# Patient Record
Sex: FEMALE | Race: WHITE | Employment: FULL TIME | URBAN - METROPOLITAN AREA
[De-identification: names, ages, dates, MRNs, and addresses within clinical notes are randomized per-mention and may not be internally consistent; named-entity substitution may affect disease eponyms.]

---

## 2017-11-28 ENCOUNTER — OFFICE VISIT (OUTPATIENT)
Dept: INTERNAL MEDICINE CLINIC | Age: 32
End: 2017-11-28

## 2017-11-28 VITALS
BODY MASS INDEX: 29.45 KG/M2 | HEIGHT: 60 IN | WEIGHT: 150 LBS | RESPIRATION RATE: 16 BRPM | HEART RATE: 112 BPM | DIASTOLIC BLOOD PRESSURE: 78 MMHG | SYSTOLIC BLOOD PRESSURE: 115 MMHG

## 2017-11-28 DIAGNOSIS — M79.A29 NON-TRAUMATIC COMPARTMENT SYNDROME OF LOWER EXTREMITY, UNSPECIFIED LATERALITY: ICD-10-CM

## 2017-11-28 DIAGNOSIS — F17.200 TOBACCO DEPENDENCE: ICD-10-CM

## 2017-11-28 DIAGNOSIS — Z00.00 ANNUAL PHYSICAL EXAM: Primary | ICD-10-CM

## 2017-11-28 PROCEDURE — 99385 PREV VISIT NEW AGE 18-39: CPT | Performed by: INTERNAL MEDICINE

## 2017-11-28 RX ORDER — BUPROPION HYDROCHLORIDE 150 MG/1
150 TABLET, EXTENDED RELEASE ORAL 2 TIMES DAILY
Qty: 60 TABLET | Refills: 2 | Status: SHIPPED | OUTPATIENT
Start: 2017-11-28 | End: 2018-03-10 | Stop reason: SDUPTHER

## 2017-11-28 RX ORDER — IBUPROFEN 600 MG/1
600 TABLET ORAL 2 TIMES DAILY WITH MEALS
COMMUNITY

## 2017-11-28 NOTE — PATIENT INSTRUCTIONS
[facility] in [address] [place] [address] Urgent Care  [address], [address], AUGIE Romero 89  M-F: 8 am - 8 pm  Sat: 9 am - 5 pm  [phone]       Preventive plan of care for Mayra Hollis        11/28/2017           Preventive Measures Status       Recommendations for screening           Cervical Cancer Screen   PAP smear:  Test is due per GYN   Osteoporosis Screen   DEXA scan  This test is not clinically indicated   Diabetes Screen  No results found for: GLUCOSE Test recommended and ordered   Cholesterol Screen  No results found for: CHOL, TRIG, HDL, LDLCALC, LDLDIRECT Test recommended and ordered   Aspirin for Cardiovascular Prevention   No Not indicated   Weight: Body mass index is 29.29 kg/m².   5' (1.524 m)150 lb (68 kg)    Your BMI is 25 or greater, which indicates that you are overweight   Living Will: No   Full Code    Recommended Immunizations    Immunization History   Administered Date(s) Administered    Influenza Virus Vaccine 10/23/2017    Tdap (Boostrix, Adacel) 09/11/2013        Influenza vaccine:  recommended every fall         Other Recommendations ·   See a dentist every 6 months  · Try to get at least 30 minutes of exercise 3-5 days per week  · Always wear a seat belt when traveling in a car  · Always wear a helmet when riding a bicycle or motorcycle  · When exposed to the sun, use a sunscreen that protects against both UVA and UVB radiation with an SPF of 30 or greater- reapply every 2 to 3 hours or after sweating, drying off with a towel, or swimming  · You need 500 mg of calcium and 2916-8830 IU of vitamin D per day- it is possible to meet your calcium requirement with diet alone, but a vitamin D supplement is usually necessary

## 2017-11-28 NOTE — PROGRESS NOTES
[facility]  History and Physical  Veverly GABBI Clifton Valentina Evans  YOB: 1985    Date of Service:  11/28/2017    Chief Complaint:   Valentina Evans is a 33 y.o. female who presents for   Chief Complaint   Patient presents with   Edi Pascual New Doctor       HPI: Here to Establish and for Annual Physical and Follow up. Compartment syndrome of both legs:  Stable on ibuprofen 600 mg bid with food  Tob dependence:  Wants to try wellbutrin to quit smoking. No results found for: LABA1C, LABMICR  No results found for: NA, K, CL, CO2, BUN, CREATININE, GLUCOSE, CALCIUM  No results found for: CHOL, TRIG, HDL, LDLCALC, LDLDIRECT  No results found for: ALT, AST  No results found for: TSH, T4FREE  Lab Results   Component Value Date    WBC 14.1 (H) 09/10/2013    HGB 10.0 (L) 09/10/2013    HCT 30.3 (L) 09/10/2013    MCV 95.0 09/10/2013     09/10/2013     No results found for: INR   No results found for: PSA   No results found for: LABURIC     Wt Readings from Last 3 Encounters:   11/28/17 150 lb (68 kg)   08/29/16 171 lb 15.3 oz (78 kg)   09/09/13 172 lb (78 kg)     BP Readings from Last 3 Encounters:   11/28/17 115/78   09/11/13 131/88       Patient Active Problem List   Diagnosis    Non-traumatic compartment syndrome of lower extremity    Tobacco dependence       No Known Allergies  Outpatient Prescriptions Marked as Taking for the 11/28/17 encounter (Office Visit) with Oumou Aldridge MD   Medication Sig Dispense Refill    ibuprofen (ADVIL;MOTRIN) 600 MG tablet Take 600 mg by mouth 2 times daily (with meals)         History reviewed. No pertinent past medical history.   Past Surgical History:   Procedure Laterality Date    FASCIOTOMY Bilateral 2008    compartment syndrome     Family History   Problem Relation Age of Onset    Asthma Father     High Blood Pressure Father     High Cholesterol Father     Arthritis Paternal Aunt     Cancer Paternal Aunt      lung    Early Death Paternal Aunt 47    Hearing Loss Paternal Aunt     High Blood Pressure Paternal Aunt     High Cholesterol Paternal Aunt     Cancer Paternal Uncle      lung    Early Death Paternal Uncle 61    Hearing Loss Paternal Uncle     High Blood Pressure Paternal Uncle     High Cholesterol Paternal Uncle     Early Death Maternal Grandmother 36    Early Death Paternal Grandmother 48    Heart Attack Paternal Grandfather 48    Early Death Paternal Grandfather 48    Kidney Disease Mother     Stroke Paternal Uncle     High Blood Pressure Paternal Uncle      Social History     Social History    Marital status: Single     Spouse name: N/A    Number of children: N/A    Years of education: N/A     Occupational History    Not on file. Social History Main Topics    Smoking status: Current Every Day Smoker     Packs/day: 0.50     Years: 19.00    Smokeless tobacco: Never Used    Alcohol use Yes      Comment: rare    Drug use: No    Sexual activity: Yes     Partners: Male     Other Topics Concern    Not on file     Social History Narrative    No narrative on file       Review of Systems:  A comprehensive review of systems was negative except for what was noted in the HPI. Physical Exam:   Vitals:    11/28/17 1444   BP: 115/78   Pulse: 112   Resp: 16   Weight: 150 lb (68 kg)   Height: 5' (1.524 m)     Body mass index is 29.29 kg/m². Constitutional: She is oriented to person, place, and time. She appears well-developed and well-nourished. No distress. HEENT:   Head: Normocephalic and atraumatic. Right Ear: Tympanic membrane, external ear and ear canal normal.   Left Ear: Tympanic membrane, external ear and ear canal normal.   Mouth/Throat: Oropharynx is clear and moist, and mucous membranes are normal.  There is no cervical adenopathy. Eyes: Conjunctivae and extraocular motions are normal. Pupils are equal, round, and reactive to light. Neck: Supple. No JVD present.  Carotid bruit is not present. No mass and no thyromegaly present. Cardiovascular: Normal rate, regular rhythm, normal heart sounds and intact distal pulses. Exam reveals no gallop and no friction rub. No murmur heard. Pulmonary/Chest: Effort normal and breath sounds normal. No respiratory distress. She has no wheezes, rhonchi or rales. Abdominal: Soft, non-tender. Bowel sounds and aorta are normal. She exhibits no organomegaly, mass or bruit. Genitourinary: performed by gynecologist.  Breast exam:  performed by specialist.  Musculoskeletal: Normal range of motion, no synovitis. She exhibits no edema. Neurological: She is alert and oriented to person, place, and time. She has normal reflexes. No cranial nerve deficit. Coordination normal.   Skin: Skin is warm and dry. There is no rash or erythema. No suspicious lesions noted. Psychiatric: She has a normal mood and affect. Her speech is normal and behavior is normal. Judgment, cognition and memory are normal.       No results found for: LABA1C, LABMICR  No results found for: NA, K, CL, CO2, BUN, CREATININE, GLUCOSE, CALCIUM  No results found for: CHOL, TRIG, HDL, LDLCALC, LDLDIRECT  No results found for: ALT, AST  No results found for: TSH, T4FREE  Lab Results   Component Value Date    WBC 14.1 (H) 09/10/2013    HGB 10.0 (L) 09/10/2013    HCT 30.3 (L) 09/10/2013    MCV 95.0 09/10/2013     09/10/2013     No results found for: INR   No results found for: PSA   No results found for: LABURIC       EKG Interpretation: .     Preventive Care:  Health Maintenance   Topic Date Due    Cervical cancer screen  09/30/2007    DTaP/Tdap/Td vaccine (2 - Td) 09/11/2023    Flu vaccine  Completed    HIV screen  Excluded      Hx abnormal PAP: no  Sexual activity: has sex with males   Last eye exam: 2017, normal  Exercise: aerobics 1 time(s) per week  Seatbelt use: yes       Preventive plan of care for Yolanda Lopez        11/28/2017           Preventive Measures Status

## 2017-12-04 LAB
A/G RATIO: 1.7 (ref 1.1–2.2)
ALBUMIN SERPL-MCNC: 5 G/DL (ref 3.4–5)
ALP BLD-CCNC: 77 U/L (ref 40–129)
ALT SERPL-CCNC: 18 U/L (ref 10–40)
ANION GAP SERPL CALCULATED.3IONS-SCNC: 16 MMOL/L (ref 3–16)
AST SERPL-CCNC: 19 U/L (ref 15–37)
BASOPHILS ABSOLUTE: 0 K/UL (ref 0–0.2)
BASOPHILS RELATIVE PERCENT: 0.1 %
BILIRUB SERPL-MCNC: 0.8 MG/DL (ref 0–1)
BUN BLDV-MCNC: 8 MG/DL (ref 7–20)
CALCIUM SERPL-MCNC: 10.2 MG/DL (ref 8.3–10.6)
CHLORIDE BLD-SCNC: 104 MMOL/L (ref 99–110)
CHOLESTEROL, TOTAL: 176 MG/DL (ref 0–199)
CO2: 25 MMOL/L (ref 21–32)
CREAT SERPL-MCNC: 0.6 MG/DL (ref 0.6–1.1)
EOSINOPHILS ABSOLUTE: 0.1 K/UL (ref 0–0.6)
EOSINOPHILS RELATIVE PERCENT: 1.6 %
GFR AFRICAN AMERICAN: >60
GFR NON-AFRICAN AMERICAN: >60
GLOBULIN: 3 G/DL
GLUCOSE BLD-MCNC: 92 MG/DL (ref 70–99)
HCT VFR BLD CALC: 45.5 % (ref 36–48)
HDLC SERPL-MCNC: 55 MG/DL (ref 40–60)
HEMOGLOBIN: 15.2 G/DL (ref 12–16)
LDL CHOLESTEROL CALCULATED: 100 MG/DL
LYMPHOCYTES ABSOLUTE: 2 K/UL (ref 1–5.1)
LYMPHOCYTES RELATIVE PERCENT: 26.4 %
MCH RBC QN AUTO: 30.6 PG (ref 26–34)
MCHC RBC AUTO-ENTMCNC: 33.4 G/DL (ref 31–36)
MCV RBC AUTO: 91.5 FL (ref 80–100)
MONOCYTES ABSOLUTE: 0.3 K/UL (ref 0–1.3)
MONOCYTES RELATIVE PERCENT: 3.9 %
NEUTROPHILS ABSOLUTE: 5 K/UL (ref 1.7–7.7)
NEUTROPHILS RELATIVE PERCENT: 68 %
PDW BLD-RTO: 12.8 % (ref 12.4–15.4)
PLATELET # BLD: 321 K/UL (ref 135–450)
PMV BLD AUTO: 9.3 FL (ref 5–10.5)
POTASSIUM SERPL-SCNC: 4.4 MMOL/L (ref 3.5–5.1)
RBC # BLD: 4.97 M/UL (ref 4–5.2)
SODIUM BLD-SCNC: 145 MMOL/L (ref 136–145)
TOTAL PROTEIN: 8 G/DL (ref 6.4–8.2)
TRIGL SERPL-MCNC: 107 MG/DL (ref 0–150)
VLDLC SERPL CALC-MCNC: 21 MG/DL
WBC # BLD: 7.4 K/UL (ref 4–11)

## 2023-10-23 ENCOUNTER — HOSPITAL ENCOUNTER (OUTPATIENT)
Dept: MAMMOGRAPHY | Age: 38
Discharge: HOME | End: 2023-10-23
Payer: OTHER GOVERNMENT

## 2023-10-23 DIAGNOSIS — Z12.39 SCREENING BREAST EXAMINATION: ICD-10-CM

## 2023-10-23 PROCEDURE — 77063 BREAST TOMOSYNTHESIS BI: CPT

## 2023-10-24 ENCOUNTER — TELEPHONE (OUTPATIENT)
Dept: MAMMOGRAPHY | Age: 38
End: 2023-10-24

## 2023-10-24 NOTE — TELEPHONE ENCOUNTER
Spoke  with patient in regards to radiologist's recommendation for follow up to screening mammogram.  [facility].  Will call and schedule when she get confirmation of payment

## 2023-11-08 ENCOUNTER — HOSPITAL ENCOUNTER (OUTPATIENT)
Dept: MAMMOGRAPHY | Age: 38
Discharge: HOME | End: 2023-11-08
Attending: RADIOLOGY
Payer: OTHER GOVERNMENT

## 2023-11-08 ENCOUNTER — APPOINTMENT (OUTPATIENT)
Dept: MAMMOGRAPHY | Age: 38
End: 2023-11-08
Payer: OTHER GOVERNMENT

## 2023-11-08 ENCOUNTER — HOSPITAL ENCOUNTER (OUTPATIENT)
Dept: ULTRASOUND IMAGING | Age: 38
Discharge: HOME | End: 2023-11-08
Attending: RADIOLOGY
Payer: OTHER GOVERNMENT

## 2023-11-08 DIAGNOSIS — R92.8 ABNORMAL MAMMOGRAM: ICD-10-CM

## 2023-11-08 PROCEDURE — 77065 DX MAMMO INCL CAD UNI: CPT

## 2023-11-08 PROCEDURE — 76642 ULTRASOUND BREAST LIMITED: CPT

## 2024-06-05 ENCOUNTER — HOSPITAL ENCOUNTER (OUTPATIENT)
Dept: ULTRASOUND IMAGING | Age: 39
End: 2024-06-05
Attending: RADIOLOGY
Payer: OTHER GOVERNMENT

## 2024-06-05 ENCOUNTER — HOSPITAL ENCOUNTER (OUTPATIENT)
Dept: MAMMOGRAPHY | Age: 39
Discharge: HOME | End: 2024-06-05
Attending: RADIOLOGY
Payer: OTHER GOVERNMENT

## 2024-06-05 VITALS — WEIGHT: 175 LBS | HEIGHT: 60 IN | BODY MASS INDEX: 34.36 KG/M2

## 2024-06-05 DIAGNOSIS — R92.8 ABNORMAL MAMMOGRAM: ICD-10-CM

## 2024-06-05 PROCEDURE — G0279 TOMOSYNTHESIS, MAMMO: HCPCS
